# Patient Record
Sex: FEMALE | Race: WHITE
[De-identification: names, ages, dates, MRNs, and addresses within clinical notes are randomized per-mention and may not be internally consistent; named-entity substitution may affect disease eponyms.]

---

## 2020-02-25 LAB
BASOPHILS # BLD AUTO: 0 10*3/UL (ref 0–0.1)
BASOPHILS NFR BLD AUTO: 0.3 % (ref 0–1)
DEPRECATED NEUTROPHILS # BLD AUTO: 4.6 10*3/UL (ref 2.1–6.9)
EOSINOPHIL # BLD AUTO: 0.2 10*3/UL (ref 0–0.4)
EOSINOPHIL NFR BLD AUTO: 2.2 % (ref 0–6)
ERYTHROCYTE [DISTWIDTH] IN CORD BLOOD: 12.6 % (ref 11.7–14.4)
HCT VFR BLD AUTO: 39.6 % (ref 34.2–44.1)
HGB BLD-MCNC: 12.9 G/DL (ref 12–16)
LYMPHOCYTES # BLD: 2.2 10*3/UL (ref 1–3.2)
LYMPHOCYTES NFR BLD AUTO: 28.6 % (ref 18–39.1)
MCH RBC QN AUTO: 29.6 PG (ref 28–32)
MCHC RBC AUTO-ENTMCNC: 32.6 G/DL (ref 31–35)
MCV RBC AUTO: 90.8 FL (ref 81–99)
MONOCYTES # BLD AUTO: 0.8 10*3/UL (ref 0.2–0.8)
MONOCYTES NFR BLD AUTO: 9.8 % (ref 4.4–11.3)
NEUTS SEG NFR BLD AUTO: 58.8 % (ref 38.7–80)
PLATELET # BLD AUTO: 164 X10E3/UL (ref 140–360)
RBC # BLD AUTO: 4.36 X10E6/UL (ref 3.6–5.1)

## 2020-02-29 ENCOUNTER — HOSPITAL ENCOUNTER (OUTPATIENT)
Dept: HOSPITAL 88 - OR | Age: 65
Discharge: HOME | End: 2020-02-29
Attending: INTERNAL MEDICINE
Payer: MEDICARE

## 2020-02-29 VITALS — DIASTOLIC BLOOD PRESSURE: 67 MMHG | SYSTOLIC BLOOD PRESSURE: 115 MMHG

## 2020-02-29 DIAGNOSIS — Z01.810: ICD-10-CM

## 2020-02-29 DIAGNOSIS — K21.9: ICD-10-CM

## 2020-02-29 DIAGNOSIS — K20.9: ICD-10-CM

## 2020-02-29 DIAGNOSIS — Z80.0: ICD-10-CM

## 2020-02-29 DIAGNOSIS — K64.0: ICD-10-CM

## 2020-02-29 DIAGNOSIS — K59.09: Primary | ICD-10-CM

## 2020-02-29 DIAGNOSIS — K22.8: ICD-10-CM

## 2020-02-29 DIAGNOSIS — K29.70: ICD-10-CM

## 2020-02-29 DIAGNOSIS — Z01.812: ICD-10-CM

## 2020-02-29 DIAGNOSIS — K22.2: ICD-10-CM

## 2020-02-29 DIAGNOSIS — I10: ICD-10-CM

## 2020-02-29 PROCEDURE — 93005 ELECTROCARDIOGRAM TRACING: CPT

## 2020-02-29 PROCEDURE — 43239 EGD BIOPSY SINGLE/MULTIPLE: CPT

## 2020-02-29 PROCEDURE — 45378 DIAGNOSTIC COLONOSCOPY: CPT

## 2020-02-29 PROCEDURE — 88305 TISSUE EXAM BY PATHOLOGIST: CPT

## 2020-02-29 PROCEDURE — 85025 COMPLETE CBC W/AUTO DIFF WBC: CPT

## 2020-02-29 PROCEDURE — 36415 COLL VENOUS BLD VENIPUNCTURE: CPT

## 2020-02-29 PROCEDURE — 43450 DILATE ESOPHAGUS 1/MULT PASS: CPT

## 2020-02-29 PROCEDURE — 88312 SPECIAL STAINS GROUP 1: CPT

## 2020-02-29 NOTE — XMS REPORT
Patient Summary Document

                             Created on: 2020



SUMEET LO

External Reference #: [id number]

: 1955

Sex: Female



Demographics







                          Address                   [address] [place] [address] 

[place]  [address]

 

                          Home Phone                [phone]

 

                          Preferred Language        Unknown

 

                          Marital Status            Unknown

 

                          Mandaen Affiliation     Unknown

 

                          Race                      Unknown

 

                          Ethnic Group              Unknown





Author







                          Author                    [facility]

 

                          Address                   Unknown

 

                          Phone                     Unavailable







Care Team Providers







                    Care Team Member Name    Role                Phone

 

                          Unavailable               Unavailable







Problems

This patient has no known problems.



Allergies, Adverse Reactions, Alerts

This patient has no known allergies or adverse reactions.



Medications

This patient has no known medications.



Results







           Test Description    Test Time    Test Comments    Text Results    Atomic Results    Result

 Comments

 

                SCR MAMM BILATERAL LAZARA CAD DIGITAL    2020-02-10 11:30:53                     - SCR MAMM BILATERAL

 LAZARA CAD DIGITALBILATERAL DIGITAL SCREENING MAMMOGRAM 3D/2D WITH CAD: 
2020CLINICAL: Asymptomatic.  Digital breast tomosynthesis was performed in 
addition to routine CC and MLO views.  Current mammographic images were 
evaluated by either a [employer] M-Vu or a [employer] ImageChecker CAD (computer aided 
detection system).  Comparison is made to exams dated  2014 mammogram and 
1/15/2007 mammogram - [facility].  There are scattered 
fibroglandular tissues in both breasts.  There is right breast asymmetry on MLO 
view, retroareolar plane, superiorly at middle depth.No other suspicious mass, 
architectural distortion, malignant type calcification, or lymph node 
abnormality detected.  IMPRESSION: INCOMPLETE: ADDITIONAL IMAGING EVALUATION 
NEEDEDRIGHT BREAST:  Asymmetry on MLO view, retroareolar plane, superiorly at 
middle depth.  Tomosynthesis spot compression and possible ultrasound are 
recommended at this time.Hank Hoover M.D.          qn/:2/10/2020 11:30:53  Imag
ing Technologist: Anju COLIN, [facility] sent: 
Additional Imaging  Mammogram BI-RADS: 0 Incomplete: Additional Imaging 
Evaluation Needed

## 2020-02-29 NOTE — OPERATIVE REPORT
DATE OF PROCEDURE:  02/29/2020

 

SURGEON:  Adam Cunningham MD

 

PROCEDURES:  EGD with esophageal dilatation and biopsies and colonoscopy.

 

INDICATIONS FOR EGD:  Dysphagia, acid reflux.

 

INDICATIONS FOR COLONOSCOPY:  Colorectal cancer screening.

 

MEDICATIONS:  The patient was done under MAC, please see anesthesiologist's note.

 

PROCEDURE IN DETAIL:  With the patient in the left lateral decubitus position, a

flexible fiberoptic Olympus gastroscope was introduced into the esophagus under direct

visualization without any difficulty.  The GE junction appeared somewhat nodular and

that was biopsied.  There was also a mild stricture noted at the GE junction and that

was dilated to size 52-[place] Jimenez.  The scope was then advanced with ease into the

stomach, mucosa overlying the antrum and the body revealed some patchy erythema and

mild-to-moderate edema, and biopsies were obtained and sent to stain for H. pylori.

Pylorus was of normal contour and shape, was intubated with ease and the scope was

advanced all the way to the second portion of the duodenum.  The scope was then

withdrawn slowly, mucosa overlying the proximal second portion and the duodenal bulb

appeared to be within normal limits.  The scope was then withdrawn back into the stomach

and retroflexed, and mucosa overlying the fundus and the cardia appeared to be within

normal limits.  The scope was then straightened out, it was subsequently withdrawn, and

the patient tolerated the procedure well. 

 

IMPRESSION:  

1. Distal esophagitis.

2. Focal nodularity, GE junction, biopsied.

3. Esophageal stricture, GE junction, dilated to size 52-[place] Jimenez.

4. Gastritis, biopsied, biopsies sent to stain for Helicobacter pylori.

 

PLAN:  Follow up histology.  Initiate Protonix 40 mg 1 p.o. q.a.m. before meals. 

 

The patient was then turned around and after adequate lubrication of the anal canal, a

flexible fiberoptic Olympus colonoscope was inserted into the rectum with ease and

advanced all the way to the cecum.  It was then withdrawn slowly, mucosa overlying the

cecum, ascending colon, transverse colon, descending colon, sigmoid colon, and rectum

appeared to be within normal limits.  The scope was then retroflexed into the distal

rectum and small internal hemorrhoids were noted, none of which was actively bleeding.

The scope was then straightened out, it was subsequently withdrawn, and the patient

tolerated the procedure well. 

 

IMPRESSION:  Internal hemorrhoids, none actively bleeding.

 

PLAN:  Initiate high-fiber, low-fat diet.  Initiate high-fiber supplement.  The patient

might benefit from a followup colonoscopy in 10 years. 

 

 

 

 

______________________________

Adam Cunningham MD

 

[facility]

D:  02/29/2020 10:42:44

T:  02/29/2020 17:18:46

Job #:  [id number]

 

cc:            Clayton eMdina DO